# Patient Record
Sex: MALE
[De-identification: names, ages, dates, MRNs, and addresses within clinical notes are randomized per-mention and may not be internally consistent; named-entity substitution may affect disease eponyms.]

---

## 2023-04-24 ENCOUNTER — NURSE TRIAGE (OUTPATIENT)
Dept: OTHER | Facility: CLINIC | Age: 48
End: 2023-04-24

## 2023-04-24 NOTE — TELEPHONE ENCOUNTER
Pt needed customer service for co pay questions. Warm transfer provided to [facility] with MMO.  No triage required for this call